# Patient Record
Sex: MALE | Race: WHITE | NOT HISPANIC OR LATINO | URBAN - METROPOLITAN AREA
[De-identification: names, ages, dates, MRNs, and addresses within clinical notes are randomized per-mention and may not be internally consistent; named-entity substitution may affect disease eponyms.]

---

## 2021-01-15 ENCOUNTER — OFFICE VISIT CONVERTED (OUTPATIENT)
Dept: SURGERY | Facility: CLINIC | Age: 61
End: 2021-01-15
Attending: NURSE PRACTITIONER

## 2021-04-30 ENCOUNTER — HOSPITAL ENCOUNTER (OUTPATIENT)
Dept: GASTROENTEROLOGY | Facility: HOSPITAL | Age: 61
Setting detail: HOSPITAL OUTPATIENT SURGERY
Discharge: HOME | End: 2021-04-30
Attending: SURGERY

## 2021-05-10 NOTE — H&P
History and Physical      Patient Name: Nick Charles   Patient ID: [id number]   Sex: Male   YOB: 1960    Primary Care Provider: Erasmo Ramos MD    Visit Date: January 15, 2021    Provider: SHIN Amaya   Location: [facility]   Location Address: [address] [place] [address]  [id number]   Location Phone: [phone]          Chief Complaint  · Age 50 or over  · FH of colon cancer  · Here today for a pre-surgical colon screening visit  · GERD  · Requesting EGD and Colonoscopy      History Of Present Illness  The patient is a 60 year old /White male presenting to the Surgical Specialist office on a referral from Maximus Donovan MD.   Nick Charles needs to have a diagnostic colonoscopy and EGD.   Patient states that they have had a colonoscopy. 5 years ago   Patient currently complains of: GERD   Patient Does have family history of colon cancer. Father      Patient presents today on referral from Dr. Maximus Donovan.  Patient presents today with complaints of GERD, along with a history of reflux esophagitis.  He is also in need of a screening colonoscopy.  Patient reports that he has been taking omeprazole and that has seemed to be helping his symptoms.  He denies any lower abdominal pain, change in bowel habit or rectal bleeding.  Admits to family history of colon cancer with his father.    12/15: EGD & Colonoscopy ([facility]): Erosive esophagitis; mild gastritis; normal colon.       Past Medical History  Disease Name Date Onset Notes   ***No Significant Medical History --  --    High blood pressure --  --    High cholesterol --  --    Kidney stones --  --          Past Surgical History  Procedure Name Date Notes   Colonoscopy 2015 --    EGD 2015 --          Medication List  Name Date Started Instructions   aspirin oral  --    lisinopril oral  --    lisinopril-hydrochlorothiazide 10-12.5 mg oral tablet 03/09/2020 TAKE 1 TABLET BY MOUTH EVERY DAY   omeprazole 40  "mg oral capsule,delayed release(DR/EC)  take 1 capsule (40 mg) by oral route once daily before a meal   Vitamin B-12 oral  --          Allergy List  Allergen Name Date Reaction Notes   NO KNOWN DRUG ALLERGIES --  --  --        Allergies Reconciled  Family Medical History  Disease Name Relative/Age Notes   Diabetes, unspecified type Father/   Father   Prostate cancer Father/   Father  Father; Uncle (paternal)   Family history of colon cancer Father/70s   Father/70s         Social History  Finding Status Start/Stop Quantity Notes   Alcohol Never 0/0 --  drinks no   Caffeine Current every day 0/0 --  drinks regularly; coffee, tea and soft drinks; 1-2 times per day   Second hand smoke exposure Never 0/0 --  no   Tobacco Never 0/0 --  never a smoker         Review of Systems  · Constitutional  o Denies  o : fever, chills  · Eyes  o Denies  o : yellowish discoloration of eyes  · HENT  o Denies  o : difficulty swallowing  · Cardiovascular  o Denies  o : chest pain, chest pain on exertion  · Respiratory  o Denies  o : shortness of breath  · Gastrointestinal  o Admits  o : heartburn, reflux  o Denies  o : nausea, vomiting, diarrhea, constipation  · Genitourinary  o Denies  o : abnormal color of urine  · Integument  o Denies  o : rash  · Neurologic  o Denies  o : tingling or numbness  · Musculoskeletal  o Denies  o : joint pain  · Endocrine  o Denies  o : weight gain, weight loss      Vitals  Date Time BP Position Site L\R Cuff Size HR RR TEMP (F) WT  HT  BMI kg/m2 BSA m2 O2 Sat FR L/min FiO2        01/15/2021 09:35 AM       12  199lbs 2oz 5'  8\" 30.28 2.08             Physical Examination  · Constitutional  o Appearance  o : well developed, well-nourished, patient in no apparent distress  · Head and Face  o Head  o :   § Inspection  § : atraumatic, normocephalic  o Face  o :   § Inspection  § : no facial lesions  · Eyes  o Conjunctivae  o : conjunctivae normal  o Sclerae  o : sclerae " white  · Neck  o Inspection/Palpation  o : normal appearance, no masses or tenderness, trachea midline  · Respiratory  o Respiratory Effort  o : breathing unlabored  · Skin and Subcutaneous Tissue  o General Inspection  o : no lesions present, no areas of discoloration, skin turgor normal, texture normal  · Neurologic  o Mental Status Examination  o :   § Orientation  § : grossly oriented to person, place and time  § Attention  § : attention normal, concentration abilities normal  § Fund of Knowledge  § : fund of knowledge within normal limits, patient aware of current events  o Gait and Station  o : normal gait, able to stand without difficulty  · Psychiatric  o Judgement and Insight  o : judgment and insight intact  o Mood and Affect  o : mood normal, affect appropriate              Assessment  · GERD (gastroesophageal reflux disease)     530.81/K21.9  · Screening for colon cancer     V76.51/Z12.11  · Pre-op testing     V72.84/Z01.818    Problems Reconciled  Plan  · Orders  o Consent for Colonoscopy Screening-Possible risk/complications, benefits, and alternatives to surgical or invasive procedure have been explained to patient and/or legal guardian. -Patient has been evaluated and can tolerate anesthesia and/or sedation. Risks, benefits, and alternatives to anesthesia and sedation have been explained to patient and/or legal guardian. () - V76.51/Z12.11, 530.81/K21.9 - 04/30/2021  o Consent for Esophagogastroduodenoscopy (EGD) with dilation - Possible risks/complications, benefits, and alternatives to surgical or invasive procedure have been explained to patient and/or legal guardian. - Patient has been evaluated and can tolerate anesthesia and/or sedation. Risks, benefits, and alternatives to anesthesia and sedation have been explained to patient and/or legal guardian. ([id number]) - V76.51/Z12.11, 530.81/K21.9 - 04/30/2021  o [facility] [place] [facility] Scre[place] (45372) - V72.84/Z01.818 - 04/26/2021   Former[facility]   · Medications  o Medications have been Reconciled  o Transition of Care or Provider Policy  · Instructions  o Surgical Facility: [facility]  o Handouts Provided Pre-Procedure Instructions including date, time, and location of procedure.   o PLAN: Proceeed with colonoscopy. Patient understands risks/benefits and is willing to proceed.   o PLAN: Proceeed with EGD. Patient understands risks/benefits and is willing to proceed.   o ***Surgical Orders***  o RISK AND BENEFITS:  o Given these options, the patient has verbally expressed an understanding of the risks of the surgery and finds these risks acceptable. Will proceed with surgery as soon as possible.  o O.R. PREP: Per protocol   o IV: Per Anesthesia  o Please sign permit for: Colonoscopy with possible biopsies by Dr. Little.  o Please sign permit for: Esophagogastroduodenoscopy with possible biopsies and dilation by Dr. Little.  o The above History and Physical Examination has been completed within 30 days of admission.  o ***Patient Status***  o Outpatient  o Follow up in the in the office post procedure.  o Electronically Identified Patient Education Materials Provided Electronically            Electronically Signed by: SHIN Amaya -Author on January 15, 2021 10:06:23 AM

## 2021-05-14 VITALS — BODY MASS INDEX: 30.18 KG/M2 | RESPIRATION RATE: 12 BRPM | HEIGHT: 68 IN | WEIGHT: 199.12 LBS
